# Patient Record
Sex: FEMALE | Race: WHITE | URBAN - METROPOLITAN AREA
[De-identification: names, ages, dates, MRNs, and addresses within clinical notes are randomized per-mention and may not be internally consistent; named-entity substitution may affect disease eponyms.]

---

## 2022-03-29 ENCOUNTER — OFFICE VISIT (OUTPATIENT)
Dept: DERMATOLOGY | Age: 42
End: 2022-03-29
Payer: COMMERCIAL

## 2022-03-29 VITALS
SYSTOLIC BLOOD PRESSURE: 120 MMHG | OXYGEN SATURATION: 98 % | HEART RATE: 84 BPM | DIASTOLIC BLOOD PRESSURE: 77 MMHG | TEMPERATURE: 97.6 F | HEIGHT: 67 IN

## 2022-03-29 DIAGNOSIS — L98.1 NEUROTIC EXCORIATIONS: Primary | ICD-10-CM

## 2022-03-29 PROCEDURE — 99203 OFFICE O/P NEW LOW 30 MIN: CPT | Performed by: PHYSICIAN ASSISTANT

## 2022-03-29 RX ORDER — HYDROCODONE BITARTRATE AND ACETAMINOPHEN 5; 325 MG/1; MG/1
1 TABLET ORAL EVERY 6 HOURS PRN
COMMUNITY

## 2022-03-29 RX ORDER — LANOLIN ALCOHOL/MO/W.PET/CERES
3 CREAM (GRAM) TOPICAL DAILY
COMMUNITY

## 2022-03-29 RX ORDER — TRIAMCINOLONE ACETONIDE 0.25 MG/G
OINTMENT TOPICAL
Qty: 454 G | Refills: 1 | Status: SHIPPED | OUTPATIENT
Start: 2022-03-29 | End: 2022-04-05

## 2022-03-29 NOTE — PROGRESS NOTES
Dermatology Patient Note  [facility] [address]  [place] [address]  Dept: [phone]  Dept Fax: [phone]      VISITDATE: 3/29/2022   REFERRING PROVIDER: No ref. provider found      Oren Sanderson is a 38 y.o. female  who presents today in the office for:    New Patient (bumps on forehead. Itching & spreads. Was treated with permetherin. Present 4-5 months; Area on skin not healing)      HISTORY OF PRESENT ILLNESS:  As above. Pt concerned that her and daughter have scabies. Admits to habitual excoriation x years. MEDICAL PROBLEMS:  There are no problems to display for this patient. CURRENT MEDICATIONS:   Current Outpatient Medications   Medication Sig Dispense Refill    melatonin 3 MG TABS tablet Take 3 mg by mouth daily      Multiple Vitamins-Minerals (MULTIVITAMIN ADULTS PO) Take by mouth      HYDROcodone-acetaminophen (NORCO) 5-325 MG per tablet Take 1 tablet by mouth every 6 hours as needed. No current facility-administered medications for this visit. ALLERGIES:   No Known Allergies    SOCIAL HISTORY:  Social History     Tobacco Use    Smoking status: Former Smoker     Quit date: 2020     Years since quittin.7    Smokeless tobacco: Never Used   Substance Use Topics    Alcohol use: Not on file       Pertinent ROS:  Review of Systems  Skin: Denies any new changing, growing or bleeding lesions or rashes except as described in the HPI   Constitutional: Denies fevers, chills, and malaise. PHYSICAL EXAM:   /77   Pulse 84   Temp 97.6 °F (36.4 °C)   Ht 5' 7\" (1.702 m)   LMP 2022   SpO2 98%     The patient is generally well appearing, well nourished, alert and conversational. Affect is normal.    Cutaneous Exam:  Physical Exam  Face, neck, and upper back were examined. Facial covering was removed during examination.     Diagnoses/exam findings/medical history pertinent to this visit are listed below:    Assessment:   Diagnosis Orders   1. Neurotic excoriations          Plan:  1. Neurotic excoriations  - Triamcinolone 0.025% ointment to be used as behavioral modification, in place of excoriating. RTC 3 months    Future Appointments   Date Time Provider Jessika Menezes   6/29/2022  3:00 PM Bacilio Ferrell PA-C  derm [facility]         There are no Patient Instructions on file for this visit.       Electronically signed by Bacilio Ferrell PA-C on 3/29/22 at 4:26 PM EDT

## 2022-04-05 ENCOUNTER — PATIENT MESSAGE (OUTPATIENT)
Dept: DERMATOLOGY | Age: 42
End: 2022-04-05

## 2022-04-05 ENCOUNTER — TELEPHONE (OUTPATIENT)
Dept: DERMATOLOGY | Age: 42
End: 2022-04-05

## 2022-04-05 DIAGNOSIS — L98.0 PYOGENIC GRANULOMA: Primary | ICD-10-CM

## 2022-04-05 RX ORDER — CLOBETASOL PROPIONATE 0.5 MG/G
OINTMENT TOPICAL
Qty: 15 G | Refills: 1 | Status: SHIPPED | OUTPATIENT
Start: 2022-04-05

## 2022-04-05 NOTE — TELEPHONE ENCOUNTER
Have her stop using hydrogen peroxide use clobetasol and mupirocin ointment until she sees me - does not matter which one she applies first. Does not need oral antibiotic.

## 2022-04-05 NOTE — TELEPHONE ENCOUNTER
Patient has a nail concern/finger concern that she states is getting worse and has pain, states she was referred to Podiatry even though there is no referral in the chart. The patient states her daughter and herself both have concerns that they feel were not met and she is still concerned about their medical concerns. She tried to call Podiatry and they stated her issue would be a Dermatology issue.

## 2022-04-05 NOTE — TELEPHONE ENCOUNTER
Future Appointments   Date Time Provider Jessika Riri   4/13/2022  2:30 PM Nguyen Schmidt MD  derm [facility]        Patient wants to send in a photo of her finger because it is causing her so much discomfort

## 2022-04-05 NOTE — TELEPHONE ENCOUNTER
From: Eliza Us  To: Kenn Davalos  Sent: 4/5/2022 2:48 PM EDT  Subject: Left ring finger     Extremely painful to touch. Some thick whitish yellow drainage. Has been like this for approximately 3 weeks . Using peroxide soaks b.i.d and keeping covered with antibacterial ointment. And bandaid . so far no signs of healing or getting better in any way . has not decreased or increased in sized . This is the size it has been since first day .

## 2022-04-05 NOTE — TELEPHONE ENCOUNTER
I have no mention of a nail issue at all. The issue that was discussed, at length, was neurotic excoriation. I don't even know what her nail issue is/was. She will need to be seen for this by Dr. Annalisa Carter.

## 2022-04-05 NOTE — TELEPHONE ENCOUNTER
Looks like a pyogenic granuloma. Probably needs shave and cautery. May not be able to address other concerns if doing that.     Hernán Eason MD

## 2022-04-13 ENCOUNTER — OFFICE VISIT (OUTPATIENT)
Dept: DERMATOLOGY | Age: 42
End: 2022-04-13
Payer: COMMERCIAL

## 2022-04-13 VITALS
TEMPERATURE: 97.9 F | DIASTOLIC BLOOD PRESSURE: 62 MMHG | OXYGEN SATURATION: 98 % | HEIGHT: 67 IN | BODY MASS INDEX: 20.03 KG/M2 | WEIGHT: 127.6 LBS | SYSTOLIC BLOOD PRESSURE: 94 MMHG | HEART RATE: 80 BPM

## 2022-04-13 DIAGNOSIS — R20.2 FORMICATION: ICD-10-CM

## 2022-04-13 DIAGNOSIS — L98.0 PYOGENIC GRANULOMA: Primary | ICD-10-CM

## 2022-04-13 DIAGNOSIS — T07.XXXA MULTIPLE EXCORIATIONS: ICD-10-CM

## 2022-04-13 PROCEDURE — 99213 OFFICE O/P EST LOW 20 MIN: CPT | Performed by: DERMATOLOGY

## 2022-04-13 NOTE — PATIENT INSTRUCTIONS
Pyogenic granuloma, periungual  - continue clobetasol 0.05% ointment    Formication  - start mupirocin

## 2022-04-13 NOTE — PROGRESS NOTES
Dermatology Patient Note  [facility] [address]  [place] [address]  Dept: [phone]  Dept Fax: [phone]      VISITDATE: 4/13/2022   REFERRING PROVIDER: No ref. provider found      Tre Farley is a 38 y.o. female  who presents today in the office for:    Other (Growth on the finger. She used the topicals and it did shrink it and take the pain away. )      HISTORY OF PRESENT ILLNESS:  Patient was recently seen by BENEDICTO Valladares and diagnosed with neurotic excoriations. Pt states she has itching on her scalp, feet, and hands. Pt reports feeling a \"crawling sensation\" on scab on scalp after applying permethrin    Pt states she used to take pain medication three times daily for lumbar spondylosis. She states she does not need to use them daily, only when the pain is severe. Pt states she has anxiety disorder but is not being treated for it. She admits to picking her skin but wants to know why the lesions don't heal quickly and why she feels the sensation of bug on her skin    Lesion on finger is much improved with clobetasol and mupirocin    MEDICAL PROBLEMS:  There are no problems to display for this patient. CURRENT MEDICATIONS:   Current Outpatient Medications   Medication Sig Dispense Refill    mupirocin (BACTROBAN) 2 % ointment Apply to affected area BID 22 g 1    clobetasol (TEMOVATE) 0.05 % ointment Apply to affected area BID 15 g 1    melatonin 3 MG TABS tablet Take 3 mg by mouth daily      Multiple Vitamins-Minerals (MULTIVITAMIN ADULTS PO) Take by mouth      HYDROcodone-acetaminophen (NORCO) 5-325 MG per tablet Take 1 tablet by mouth every 6 hours as needed. (Patient not taking: Reported on 4/13/2022)       No current facility-administered medications for this visit.        ALLERGIES:   No Known Allergies    SOCIAL HISTORY:  Social History     Tobacco Use    Smoking status: Former Smoker     Quit date: 2020     Years since quittin.8    Smokeless tobacco: Never Used   Substance Use Topics    Alcohol use: Never       Pertinent ROS:  Review of Systems  Skin: Denies any new changing, growing or bleeding lesions or rashes except as described in the HPI   Constitutional: Denies fevers, chills, and malaise. PHYSICAL EXAM:   BP 94/62 (Site: Left Upper Arm, Position: Sitting, Cuff Size: Medium Adult)   Pulse 80   Temp 97.9 °F (36.6 °C) (Temporal)   Ht 5' 7\" (1.702 m)   Wt 127 lb 9.6 oz (57.9 kg)   SpO2 98%   BMI 19.98 kg/m²     The patient is generally well appearing, well nourished, alert and conversational. Affect is normal.    Cutaneous Exam:  Physical Exam  Focused exam of hands was performed    Facial covering was removed during examination. Diagnoses/exam findings/medical history pertinent to this visit are listed below:    Assessment:   Diagnosis Orders   1. Pyogenic granuloma     2.  Formication          Plan:  Favor pyogenic granuloma, periungual  - much improved with mupirocin and clobetasol and mupirocin, continue  - if persistent, refer to Dr. Bhavya Sylvester plastic surgery for removal    Formication with excoriations and possible secondary staph infection, involving primarily scalp arms, shoulders, upper back, not present in areas that patient cannot reach  - discussed with patient that there is no evidence of scabies or pediculosis on exam  - start mupirocin ointment for open sores twice daily  - try to stop picking/scratching  - prioritize mental health/treat anxiety  - if formication persistent, can consider lab work-up    RTC 4 months    Future Appointments   Date Time Provider Jessika Menezes   2022  2:45 PM Nicole Pitts MD [facility]         Patient Instructions   Pyogenic granuloma, periungual  - continue clobetasol 0.05% ointment    Formication  - start mupirocin          Victor Hugo AVENDAÑO, personally scribed the services dictated to me by Dr. Marcial Wright in this documentation. I, Dr. Haydee Scott, personally performed the services described in this documentation, as scribed by Elvira Sanders in my presence, and it is both accurate and complete.     Electronically signed by Ovidio Healy MD on 4/13/2022 at 9:58 PM